# Patient Record
Sex: MALE | Race: WHITE | NOT HISPANIC OR LATINO | URBAN - METROPOLITAN AREA
[De-identification: names, ages, dates, MRNs, and addresses within clinical notes are randomized per-mention and may not be internally consistent; named-entity substitution may affect disease eponyms.]

---

## 2023-08-02 PROBLEM — M77.01 LITTLE LEAGUE ELBOW SYNDROME, RIGHT: Status: RESOLVED | Noted: 2023-08-02 | Resolved: 2023-08-02

## 2023-08-02 PROBLEM — M93.959 APOPHYSITIS OF ILIAC CREST: Status: RESOLVED | Noted: 2023-08-02 | Resolved: 2023-08-02

## 2023-08-02 PROBLEM — M77.01 LITTLE LEAGUE ELBOW SYNDROME, RIGHT: Status: ACTIVE | Noted: 2023-08-02

## 2023-08-02 PROBLEM — F90.0 ATTENTION DEFICIT HYPERACTIVITY DISORDER, PREDOMINANTLY INATTENTIVE TYPE: Status: ACTIVE | Noted: 2023-08-02

## 2023-08-02 PROBLEM — M93.959 APOPHYSITIS OF ILIAC CREST: Status: ACTIVE | Noted: 2023-08-02

## 2023-08-02 RX ORDER — METHYLPHENIDATE HYDROCHLORIDE 18 MG/1
18 TABLET ORAL EVERY MORNING
COMMUNITY
Start: 2016-08-29

## 2023-08-02 RX ORDER — METHYLPHENIDATE HYDROCHLORIDE 5 MG/1
5 TABLET ORAL
COMMUNITY
End: 2023-08-07 | Stop reason: ALTCHOICE

## 2023-08-02 NOTE — PROGRESS NOTES
"Subjective   History was provided by the  patient .  Emil Stewart is a 22 y.o. male who is here for this well visit.    Current Issues:  Current concerns include none.  Celiac disease  - gluten free diet going well  - no sx   - GI fu needed now    Attention deficit hyperactivity disorder, predominantly inattentive type  - me-ph ER 18 every day - usually only on school days  - me-ph IR 5 qPM - hasn't needed this   - going well w/ current dose  - no side fx per pt   - hasn't considered dose change at this time  - can call for AM rx prn (no PM dose needed)    Sleep: all night  Dental:  brushes teeth 2x/d - sees dentist  No issues using restroom   Testicular self-exams discussed    Review of Nutrition:  Current diet: adequate milk/Vit D source  Adequate fruit/vegetable intake    Social Screening:   Grade:  senior rising at [employer] for [employer]  School performance: doing well; no concerns  Activities:  gets regular exercise baseball  (3B)  Job:  Yes - [employer]    Safety:  Risk factors for sexually-transmitted infections: no - prefer F only - no hx STD - denies needing tested today  Using alcohol/drug use:  yes - weekend EtOH - not driving  Tobacco/nicotine use:  no use  Uses seat belt - no texting while driving  Uses helmet for bikes/etc    Screening Questions:  Mood (see PHQ9): good    Objective   /78 (BP Location: Left arm, Patient Position: Sitting)   Pulse 84   Ht 1.826 m (5' 11.88\")   Wt 86.8 kg (191 lb 4 oz)   BMI 26.02 kg/m²   Physical Exam  Constitutional:       Appearance: Normal appearance.   HENT:      Right Ear: Tympanic membrane normal.      Left Ear: Tympanic membrane normal.      Nose: Nose normal.      Mouth/Throat:      Mouth: Mucous membranes are moist.      Pharynx: Oropharynx is clear.   Eyes:      Extraocular Movements: Extraocular movements intact.   Cardiovascular:      Rate and Rhythm: Normal rate and regular rhythm.      Pulses:           Radial pulses are 2+ on the right " side and 2+ on the left side.      Heart sounds: No murmur heard.  Pulmonary:      Effort: Pulmonary effort is normal.      Breath sounds: Normal breath sounds.   Abdominal:      General: Abdomen is flat.      Palpations: Abdomen is soft. There is no hepatomegaly, splenomegaly or mass.   Genitourinary:     Penis: Normal.       Testes: Normal.         Right: Testicular hydrocele not present.         Left: Testicular hydrocele not present.      Ammon stage (genital): 5.   Musculoskeletal:         General: Normal range of motion.      Cervical back: Normal range of motion and neck supple.      Thoracic back: No scoliosis.      Lumbar back: No scoliosis.   Lymphadenopathy:      Cervical: No cervical adenopathy.   Skin:     General: Skin is warm and dry.   Neurological:      General: No focal deficit present.      Mental Status: He is alert.      Deep Tendon Reflexes:      Reflex Scores:       Patellar reflexes are 2+ on the right side and 2+ on the left side.  Psychiatric:         Mood and Affect: Mood normal.         Behavior: Behavior normal.       Assessment/Plan   22 y.o. male w/ NL G+D  1. Anticipatory guidance discussed.   2. Cleared for school/sports  3. Vaccine, if given, discussed and consented.  4. Follow up in 1 year for next well child exam or sooner with concerns.

## 2023-08-02 NOTE — ASSESSMENT & PLAN NOTE
- me-ph ER 18 every day - usually only on school days  - me-ph IR 5 qPM - hasn't needed this   - going well w/ current dose  - no side fx per pt   - hasn't considered dose change at this time  - can call for AM rx prn (no PM dose needed)

## 2023-08-07 ENCOUNTER — OFFICE VISIT (OUTPATIENT)
Dept: PEDIATRICS | Facility: CLINIC | Age: 23
End: 2023-08-07
Payer: COMMERCIAL

## 2023-08-07 VITALS
HEART RATE: 84 BPM | HEIGHT: 72 IN | WEIGHT: 191.25 LBS | DIASTOLIC BLOOD PRESSURE: 78 MMHG | SYSTOLIC BLOOD PRESSURE: 124 MMHG | BODY MASS INDEX: 25.9 KG/M2

## 2023-08-07 DIAGNOSIS — F90.0 ATTENTION DEFICIT HYPERACTIVITY DISORDER, PREDOMINANTLY INATTENTIVE TYPE: ICD-10-CM

## 2023-08-07 DIAGNOSIS — K90.0 CELIAC DISEASE (HHS-HCC): ICD-10-CM

## 2023-08-07 DIAGNOSIS — Z23 NEED FOR VACCINATION: ICD-10-CM

## 2023-08-07 DIAGNOSIS — Z00.121 ENCOUNTER FOR ROUTINE CHILD HEALTH EXAMINATION WITH ABNORMAL FINDINGS: Primary | ICD-10-CM

## 2023-08-07 PROCEDURE — 96127 BRIEF EMOTIONAL/BEHAV ASSMT: CPT | Performed by: PEDIATRICS

## 2023-08-07 PROCEDURE — 90471 IMMUNIZATION ADMIN: CPT | Performed by: PEDIATRICS

## 2023-08-07 PROCEDURE — 99177 OCULAR INSTRUMNT SCREEN BIL: CPT | Performed by: PEDIATRICS

## 2023-08-07 PROCEDURE — 92551 PURE TONE HEARING TEST AIR: CPT | Performed by: PEDIATRICS

## 2023-08-07 PROCEDURE — 3008F BODY MASS INDEX DOCD: CPT | Performed by: PEDIATRICS

## 2023-08-07 PROCEDURE — 99213 OFFICE O/P EST LOW 20 MIN: CPT | Performed by: PEDIATRICS

## 2023-08-07 PROCEDURE — 99395 PREV VISIT EST AGE 18-39: CPT | Performed by: PEDIATRICS

## 2023-08-07 PROCEDURE — 90715 TDAP VACCINE 7 YRS/> IM: CPT | Performed by: PEDIATRICS

## 2023-08-07 ASSESSMENT — PATIENT HEALTH QUESTIONNAIRE - PHQ9
1. LITTLE INTEREST OR PLEASURE IN DOING THINGS: NOT AT ALL
5. POOR APPETITE OR OVEREATING: NOT AT ALL
8. MOVING OR SPEAKING SO SLOWLY THAT OTHER PEOPLE COULD HAVE NOTICED. OR THE OPPOSITE, BEING SO FIGETY OR RESTLESS THAT YOU HAVE BEEN MOVING AROUND A LOT MORE THAN USUAL: NOT AT ALL
9. THOUGHTS THAT YOU WOULD BE BETTER OFF DEAD, OR OF HURTING YOURSELF: NOT AT ALL
4. FEELING TIRED OR HAVING LITTLE ENERGY: NOT AT ALL
7. TROUBLE CONCENTRATING ON THINGS, SUCH AS READING THE NEWSPAPER OR WATCHING TELEVISION: NOT AT ALL
SUM OF ALL RESPONSES TO PHQ9 QUESTIONS 1 AND 2: 0
3. TROUBLE FALLING OR STAYING ASLEEP OR SLEEPING TOO MUCH: NOT AT ALL
2. FEELING DOWN, DEPRESSED OR HOPELESS: NOT AT ALL
6. FEELING BAD ABOUT YOURSELF - OR THAT YOU ARE A FAILURE OR HAVE LET YOURSELF OR YOUR FAMILY DOWN: NOT AT ALL
SUM OF ALL RESPONSES TO PHQ QUESTIONS 1-9: 0

## 2023-08-14 ENCOUNTER — APPOINTMENT (OUTPATIENT)
Dept: PEDIATRICS | Facility: CLINIC | Age: 23
End: 2023-08-14
Payer: COMMERCIAL

## 2024-07-30 ENCOUNTER — APPOINTMENT (OUTPATIENT)
Dept: PRIMARY CARE | Facility: CLINIC | Age: 24
End: 2024-07-30
Payer: COMMERCIAL

## 2024-08-13 ENCOUNTER — APPOINTMENT (OUTPATIENT)
Dept: PRIMARY CARE | Facility: CLINIC | Age: 24
End: 2024-08-13
Payer: COMMERCIAL